# Patient Record
Sex: FEMALE | Race: ASIAN | NOT HISPANIC OR LATINO | URBAN - METROPOLITAN AREA
[De-identification: names, ages, dates, MRNs, and addresses within clinical notes are randomized per-mention and may not be internally consistent; named-entity substitution may affect disease eponyms.]

---

## 2020-06-22 ENCOUNTER — OFFICE VISIT (OUTPATIENT)
Dept: URBAN - METROPOLITAN AREA CLINIC 27 | Facility: CLINIC | Age: 80
End: 2020-06-22

## 2020-08-24 ENCOUNTER — OFFICE VISIT (OUTPATIENT)
Dept: URBAN - METROPOLITAN AREA CLINIC 27 | Facility: CLINIC | Age: 80
End: 2020-08-24

## 2020-09-11 ENCOUNTER — OFFICE VISIT (OUTPATIENT)
Dept: URBAN - METROPOLITAN AREA SURGERY CENTER 7 | Facility: SURGERY CENTER | Age: 80
End: 2020-09-11

## 2022-04-30 ENCOUNTER — TELEPHONE ENCOUNTER (OUTPATIENT)
Dept: URBAN - METROPOLITAN AREA CLINIC 121 | Facility: CLINIC | Age: 82
End: 2022-04-30

## 2022-04-30 RX ORDER — GABAPENTIN 300 MG/1
BID CAPSULE ORAL
OUTPATIENT
Start: 2011-05-24 | End: 2015-05-11

## 2022-04-30 RX ORDER — ESOMEPRAZOLE MAGNESIUM 40 MG
1 CAPSULE PO BID CAPSULE,DELAYED RELEASE (ENTERIC COATED) ORAL
OUTPATIENT
Start: 2015-07-20 | End: 2016-08-25

## 2022-04-30 RX ORDER — L.ACID,FERM,PLA,RHA/B.BIF,LONG 126 MG
QD TABLET, DELAYED AND EXTENDED RELEASE ORAL
OUTPATIENT
Start: 2015-05-11

## 2022-04-30 RX ORDER — PANTOPRAZOLE SODIUM 40 MG/1
1 TABLET PO BID TABLET, DELAYED RELEASE ORAL
OUTPATIENT
Start: 2015-11-23

## 2022-04-30 RX ORDER — OMEPRAZOLE 40 MG/1
1 CAP PO QHS CAPSULE, DELAYED RELEASE ORAL
OUTPATIENT
Start: 2013-07-22

## 2022-04-30 RX ORDER — NORTRIPTYLINE HYDROCHLORIDE 25 MG/1
1 CAPSULE PO QHS CAPSULE ORAL
OUTPATIENT
Start: 2015-07-20 | End: 2016-08-25

## 2022-04-30 RX ORDER — GABAPENTIN 300 MG/1
BID CAPSULE ORAL
OUTPATIENT
Start: 2011-05-24

## 2022-04-30 RX ORDER — OMEPRAZOLE 20 MG/1
QD TABLET, DELAYED RELEASE ORAL
OUTPATIENT
Start: 2015-05-11 | End: 2016-08-25

## 2022-04-30 RX ORDER — ESOMEPRAZOLE MAGNESIUM 20 MG
1 CAPSULE PO BID CAPSULE,DELAYED RELEASE (ENTERIC COATED) ORAL
OUTPATIENT
Start: 2015-07-20

## 2022-04-30 RX ORDER — FLUTICASONE PROPIONATE 50 UG/1
1 SPRAY IN EACH NOSTRIL QD SPRAY, METERED NASAL
OUTPATIENT
Start: 2016-08-25

## 2022-04-30 RX ORDER — OMEPRAZOLE 20 MG/1
1 CAPSULE BY MOUTH DAILY CAPSULE, DELAYED RELEASE ORAL
OUTPATIENT
Start: 2020-08-24 | End: 2020-09-07

## 2022-04-30 RX ORDER — DOXAZOSIN MESYLATE 1 MG/1
TABLET ORAL
OUTPATIENT
Start: 2011-06-06

## 2022-04-30 RX ORDER — NORTRIPTYLINE HYDROCHLORIDE 25 MG/1
1 CAPSULE PO QHS CAPSULE ORAL
OUTPATIENT
Start: 2015-07-20

## 2022-04-30 RX ORDER — LOSARTAN POTASSIUM 25 MG/1
1 PO QD TABLET, FILM COATED ORAL
OUTPATIENT
Start: 2013-07-22

## 2022-04-30 RX ORDER — L.ACID,FERM,PLA,RHA/B.BIF,LONG 126 MG
QD TABLET, DELAYED AND EXTENDED RELEASE ORAL
OUTPATIENT
Start: 2015-05-11 | End: 2016-08-25

## 2022-04-30 RX ORDER — DOXAZOSIN MESYLATE 1 MG/1
TABLET ORAL
OUTPATIENT
Start: 2011-06-06 | End: 2015-05-11

## 2022-04-30 RX ORDER — CHLORPHENIRAMINE MALEATE, IBUPROFEN, AND PHENYLEPHRINE HYDROCHLORIDE 4; 200; 10 MG/1; MG/1; MG/1
1-2/ WK TABLET, COATED ORAL
OUTPATIENT
Start: 2011-05-24 | End: 2011-06-06

## 2022-04-30 RX ORDER — ESOMEPRAZOLE MAGNESIUM 40 MG
1 CAPSULE PO BID CAPSULE,DELAYED RELEASE (ENTERIC COATED) ORAL
OUTPATIENT
Start: 2015-07-20

## 2022-04-30 RX ORDER — OMEPRAZOLE 20 MG/1
QD TABLET, DELAYED RELEASE ORAL
OUTPATIENT
Start: 2015-05-11

## 2022-04-30 RX ORDER — ESOMEPRAZOLE MAGNESIUM 40 MG
1 CAPSULE PO BID CAPSULE,DELAYED RELEASE (ENTERIC COATED) ORAL
OUTPATIENT
Start: 2015-06-02

## 2022-04-30 RX ORDER — CHLORPHENIRAMINE MALEATE, IBUPROFEN, AND PHENYLEPHRINE HYDROCHLORIDE 4; 200; 10 MG/1; MG/1; MG/1
1-2/ WK TABLET, COATED ORAL
OUTPATIENT
Start: 2011-05-24

## 2022-05-01 ENCOUNTER — TELEPHONE ENCOUNTER (OUTPATIENT)
Dept: URBAN - METROPOLITAN AREA CLINIC 121 | Facility: CLINIC | Age: 82
End: 2022-05-01

## 2022-05-01 RX ORDER — DEXLANSOPRAZOLE 60 MG/1
TAKE 1 CAPSULE PO QD CAPSULE, DELAYED RELEASE ORAL
Status: ACTIVE | COMMUNITY
Start: 2018-04-09

## 2022-05-01 RX ORDER — FAMOTIDINE 40 MG/1
1 TABLET PO QHS TABLET, FILM COATED ORAL
Status: ACTIVE | COMMUNITY
Start: 2017-06-01

## 2022-05-01 RX ORDER — CARVEDILOL 6.25 MG/1
1/2 TAB PO QD TABLET, FILM COATED ORAL
Status: ACTIVE | COMMUNITY
Start: 2013-07-22

## 2022-05-01 RX ORDER — FLUCONAZOLE 200 MG/1
TAKE 1 TABLET BY MOUTH DAILY TABLET ORAL
Status: ACTIVE | COMMUNITY
Start: 2020-09-11

## 2022-05-01 RX ORDER — FLUTICASONE PROPIONATE 50 UG/1
2 SPRAYS IN EACH NOSTRIL QD SPRAY, METERED NASAL
Status: ACTIVE | COMMUNITY
Start: 2017-06-01

## 2022-05-01 RX ORDER — PANTOPRAZOLE SODIUM 40 MG/1
TAKE ONE TABLET BY MOUTH TWICE A DAY TABLET, DELAYED RELEASE ORAL
Status: ACTIVE | COMMUNITY
Start: 2018-12-10

## 2022-05-01 RX ORDER — LOSARTAN POTASSIUM 50 MG/1
TABLET, FILM COATED ORAL
Status: ACTIVE | COMMUNITY
Start: 2016-08-25

## 2022-08-05 ENCOUNTER — LAB OUTSIDE AN ENCOUNTER (OUTPATIENT)
Dept: URBAN - METROPOLITAN AREA CLINIC 27 | Facility: CLINIC | Age: 82
End: 2022-08-05

## 2022-08-05 ENCOUNTER — OFFICE VISIT (OUTPATIENT)
Dept: URBAN - METROPOLITAN AREA CLINIC 27 | Facility: CLINIC | Age: 82
End: 2022-08-05
Payer: MEDICARE

## 2022-08-05 ENCOUNTER — WEB ENCOUNTER (OUTPATIENT)
Dept: URBAN - METROPOLITAN AREA CLINIC 27 | Facility: CLINIC | Age: 82
End: 2022-08-05

## 2022-08-05 VITALS
HEART RATE: 68 BPM | TEMPERATURE: 97.3 F | HEIGHT: 58 IN | BODY MASS INDEX: 24.14 KG/M2 | DIASTOLIC BLOOD PRESSURE: 75 MMHG | RESPIRATION RATE: 16 BRPM | SYSTOLIC BLOOD PRESSURE: 130 MMHG | WEIGHT: 115 LBS

## 2022-08-05 DIAGNOSIS — K22.70 BARRETT'S ESOPHAGUS WITHOUT DYSPLASIA: ICD-10-CM

## 2022-08-05 DIAGNOSIS — K21.9 GASTRO-ESOPHAGEAL REFLUX DISEASE WITHOUT ESOPHAGITIS: ICD-10-CM

## 2022-08-05 DIAGNOSIS — Z86.010 PERSONAL HISTORY OF COLONIC POLYPS: ICD-10-CM

## 2022-08-05 PROCEDURE — 99214 OFFICE O/P EST MOD 30 MIN: CPT | Performed by: INTERNAL MEDICINE

## 2022-08-05 RX ORDER — FLUCONAZOLE 200 MG/1
TAKE 1 TABLET BY MOUTH DAILY TABLET ORAL
Status: ACTIVE | COMMUNITY
Start: 2020-09-11

## 2022-08-05 RX ORDER — CARVEDILOL 6.25 MG/1
1/2 TAB PO QD TABLET, FILM COATED ORAL
Status: ACTIVE | COMMUNITY
Start: 2013-07-22

## 2022-08-05 RX ORDER — FLUTICASONE PROPIONATE 50 UG/1
2 SPRAYS IN EACH NOSTRIL QD SPRAY, METERED NASAL
Status: ACTIVE | COMMUNITY
Start: 2017-06-01

## 2022-08-05 RX ORDER — PANTOPRAZOLE SODIUM 40 MG/1
TAKE ONE TABLET BY MOUTH TWICE A DAY TABLET, DELAYED RELEASE ORAL
Status: ACTIVE | COMMUNITY
Start: 2018-12-10

## 2022-08-05 RX ORDER — FAMOTIDINE 40 MG/1
1 TABLET PO QHS TABLET, FILM COATED ORAL
Status: ACTIVE | COMMUNITY
Start: 2017-06-01

## 2022-08-05 RX ORDER — DEXLANSOPRAZOLE 60 MG/1
TAKE 1 CAPSULE PO QD CAPSULE, DELAYED RELEASE ORAL
Status: ACTIVE | COMMUNITY
Start: 2018-04-09

## 2022-08-05 RX ORDER — PANTOPRAZOLE SODIUM 40 MG/1
1 TABLET TABLET, DELAYED RELEASE ORAL TWICE A DAY
Qty: 60 TABLET | Refills: 3 | OUTPATIENT
Start: 2022-08-05

## 2022-08-05 RX ORDER — LOSARTAN POTASSIUM 50 MG/1
TABLET, FILM COATED ORAL
Status: ACTIVE | COMMUNITY
Start: 2016-08-25

## 2022-08-10 PROBLEM — 428283002 HISTORY OF POLYP OF COLON (SITUATION): Status: ACTIVE | Noted: 2022-08-05

## 2022-08-29 ENCOUNTER — OFFICE VISIT (OUTPATIENT)
Dept: URBAN - METROPOLITAN AREA SURGERY CENTER 7 | Facility: SURGERY CENTER | Age: 82
End: 2022-08-29
Payer: MEDICARE

## 2022-08-29 DIAGNOSIS — K29.60 OTHER GASTRITIS WITHOUT BLEEDING: ICD-10-CM

## 2022-08-29 DIAGNOSIS — R10.13 ABDOMINAL DISCOMFORT, EPIGASTRIC: ICD-10-CM

## 2022-08-29 DIAGNOSIS — K22.70 BARRETT ESOPHAGUS: ICD-10-CM

## 2022-08-29 DIAGNOSIS — K31.89 ACQUIRED DEFORMITY OF DUODENUM: ICD-10-CM

## 2022-08-29 DIAGNOSIS — Z86.010 ADENOMAS PERSONAL HISTORY OF COLONIC POLYPS: ICD-10-CM

## 2022-08-29 DIAGNOSIS — D12.5 ADENOMA OF SIGMOID COLON: ICD-10-CM

## 2022-08-29 PROCEDURE — 43239 EGD BIOPSY SINGLE/MULTIPLE: CPT | Performed by: INTERNAL MEDICINE

## 2022-08-29 PROCEDURE — 43239 EGD BIOPSY SINGLE/MULTIPLE: CPT | Performed by: CLINIC/CENTER

## 2022-08-29 PROCEDURE — G8907 PT DOC NO EVENTS ON DISCHARG: HCPCS | Performed by: CLINIC/CENTER

## 2022-08-29 PROCEDURE — 45380 COLONOSCOPY AND BIOPSY: CPT | Performed by: INTERNAL MEDICINE

## 2022-08-29 PROCEDURE — G8907 PT DOC NO EVENTS ON DISCHARG: HCPCS | Performed by: INTERNAL MEDICINE

## 2022-08-29 PROCEDURE — 45380 COLONOSCOPY AND BIOPSY: CPT | Performed by: CLINIC/CENTER

## 2022-08-29 RX ORDER — PANTOPRAZOLE SODIUM 40 MG/1
1 TABLET TABLET, DELAYED RELEASE ORAL TWICE A DAY
Qty: 60 TABLET | Refills: 3 | Status: ACTIVE | COMMUNITY
Start: 2022-08-05

## 2022-08-29 RX ORDER — FLUCONAZOLE 200 MG/1
TAKE 1 TABLET BY MOUTH DAILY TABLET ORAL
Status: ACTIVE | COMMUNITY
Start: 2020-09-11

## 2022-08-29 RX ORDER — LOSARTAN POTASSIUM 50 MG/1
TABLET, FILM COATED ORAL
Status: ACTIVE | COMMUNITY
Start: 2016-08-25

## 2022-08-29 RX ORDER — FAMOTIDINE 40 MG/1
1 TABLET PO QHS TABLET, FILM COATED ORAL
Status: ACTIVE | COMMUNITY
Start: 2017-06-01

## 2022-08-29 RX ORDER — DEXLANSOPRAZOLE 60 MG/1
TAKE 1 CAPSULE PO QD CAPSULE, DELAYED RELEASE ORAL
Status: ACTIVE | COMMUNITY
Start: 2018-04-09

## 2022-08-29 RX ORDER — CARVEDILOL 6.25 MG/1
1/2 TAB PO QD TABLET, FILM COATED ORAL
Status: ACTIVE | COMMUNITY
Start: 2013-07-22

## 2022-08-29 RX ORDER — PANTOPRAZOLE SODIUM 40 MG/1
TAKE ONE TABLET BY MOUTH TWICE A DAY TABLET, DELAYED RELEASE ORAL
Status: ACTIVE | COMMUNITY
Start: 2018-12-10

## 2022-08-29 RX ORDER — FLUTICASONE PROPIONATE 50 UG/1
2 SPRAYS IN EACH NOSTRIL QD SPRAY, METERED NASAL
Status: ACTIVE | COMMUNITY
Start: 2017-06-01

## 2022-09-08 ENCOUNTER — DASHBOARD ENCOUNTERS (OUTPATIENT)
Age: 82
End: 2022-09-08

## 2022-09-08 ENCOUNTER — OFFICE VISIT (OUTPATIENT)
Dept: URBAN - METROPOLITAN AREA CLINIC 27 | Facility: CLINIC | Age: 82
End: 2022-09-08
Payer: MEDICARE

## 2022-09-08 VITALS
HEART RATE: 62 BPM | WEIGHT: 115 LBS | DIASTOLIC BLOOD PRESSURE: 65 MMHG | TEMPERATURE: 96.9 F | BODY MASS INDEX: 22.58 KG/M2 | HEIGHT: 60 IN | SYSTOLIC BLOOD PRESSURE: 130 MMHG

## 2022-09-08 DIAGNOSIS — K22.70 BARRETT'S ESOPHAGUS WITHOUT DYSPLASIA: ICD-10-CM

## 2022-09-08 DIAGNOSIS — K21.9 GASTRO-ESOPHAGEAL REFLUX DISEASE WITHOUT ESOPHAGITIS: ICD-10-CM

## 2022-09-08 DIAGNOSIS — R10.84 GENERALIZED ABDOMINAL PAIN: ICD-10-CM

## 2022-09-08 PROBLEM — 302914006 BARRETT'S ESOPHAGUS: Status: ACTIVE | Noted: 2018-06-05

## 2022-09-08 PROCEDURE — 99214 OFFICE O/P EST MOD 30 MIN: CPT | Performed by: INTERNAL MEDICINE

## 2022-09-08 RX ORDER — FLUCONAZOLE 200 MG/1
TAKE 1 TABLET BY MOUTH DAILY TABLET ORAL
Status: ACTIVE | COMMUNITY
Start: 2020-09-11

## 2022-09-08 RX ORDER — PANTOPRAZOLE SODIUM 40 MG/1
1 TABLET TABLET, DELAYED RELEASE ORAL TWICE A DAY
Qty: 60 TABLET | Refills: 3 | Status: ACTIVE | COMMUNITY
Start: 2022-08-05

## 2022-09-08 RX ORDER — FAMOTIDINE 40 MG/1
1 TABLET PO QHS TABLET, FILM COATED ORAL
Status: ACTIVE | COMMUNITY
Start: 2017-06-01

## 2022-09-08 RX ORDER — FLUTICASONE PROPIONATE 50 UG/1
2 SPRAYS IN EACH NOSTRIL QD SPRAY, METERED NASAL
Status: ACTIVE | COMMUNITY
Start: 2017-06-01

## 2022-09-08 RX ORDER — LOSARTAN POTASSIUM 50 MG/1
TABLET, FILM COATED ORAL
Status: ACTIVE | COMMUNITY
Start: 2016-08-25

## 2022-09-08 RX ORDER — CARVEDILOL 6.25 MG/1
1/2 TAB PO QD TABLET, FILM COATED ORAL
Status: ACTIVE | COMMUNITY
Start: 2013-07-22

## 2022-09-08 RX ORDER — DEXLANSOPRAZOLE 60 MG/1
TAKE 1 CAPSULE PO QD CAPSULE, DELAYED RELEASE ORAL
Status: ACTIVE | COMMUNITY
Start: 2018-04-09

## 2022-09-08 RX ORDER — PANTOPRAZOLE SODIUM 40 MG/1
TAKE ONE TABLET BY MOUTH TWICE A DAY TABLET, DELAYED RELEASE ORAL
Status: ACTIVE | COMMUNITY
Start: 2018-12-10

## 2022-09-08 NOTE — HPI-TODAY'S VISIT:
81-year-old female here for follow-up of GERD, abdominal discomfort, Ihlario's esophagus.  She had an EGD last month that showed short segment Hilario's.  Her reflux was not under good control, omeprazole was switched to pantoprazole which has significantly improved symptoms.  Her abdominal discomfort improved after bowel prep for colonoscopy.  She does have some mild chronic constipation.

## 2022-09-09 PROBLEM — 4556007 GASTRITIS: Status: ACTIVE | Noted: 2022-09-09

## 2023-05-15 ENCOUNTER — ERX REFILL RESPONSE (OUTPATIENT)
Dept: URBAN - METROPOLITAN AREA CLINIC 27 | Facility: CLINIC | Age: 83
End: 2023-05-15

## 2023-05-15 RX ORDER — PANTOPRAZOLE SODIUM 40 MG/1
TAKE ONE TABLET BY MOUTH TWICE A DAY TABLET, DELAYED RELEASE ORAL
Qty: 60 TABLET | Refills: 3 | OUTPATIENT

## 2023-05-15 RX ORDER — PANTOPRAZOLE SODIUM 40 MG/1
1 TABLET TABLET, DELAYED RELEASE ORAL TWICE A DAY
Qty: 60 TABLET | Refills: 3 | OUTPATIENT

## 2023-05-27 NOTE — PHYSICAL EXAM CONSTITUTIONAL:
well developed, well nourished , in no acute distress , ambulating without difficulty , normal communication ability obese/debilitated No Ht. documented at admission  Confirmed with pt. Ht. 5' 4"  obese/debilitated/other (specify)